# Patient Record
Sex: FEMALE | Race: OTHER | URBAN - METROPOLITAN AREA
[De-identification: names, ages, dates, MRNs, and addresses within clinical notes are randomized per-mention and may not be internally consistent; named-entity substitution may affect disease eponyms.]

---

## 2018-01-01 ENCOUNTER — EMERGENCY (EMERGENCY)
Facility: HOSPITAL | Age: 58
LOS: 0 days | End: 2018-03-20
Attending: EMERGENCY MEDICINE
Payer: COMMERCIAL

## 2018-01-01 VITALS
HEART RATE: 128 BPM | TEMPERATURE: 97 F | SYSTOLIC BLOOD PRESSURE: 101 MMHG | WEIGHT: 229.94 LBS | RESPIRATION RATE: 20 BRPM | HEIGHT: 64 IN | DIASTOLIC BLOOD PRESSURE: 16 MMHG | OXYGEN SATURATION: 56 %

## 2018-01-01 VITALS — HEART RATE: 72 BPM | OXYGEN SATURATION: 67 %

## 2018-01-01 DIAGNOSIS — R06.02 SHORTNESS OF BREATH: ICD-10-CM

## 2018-01-01 DIAGNOSIS — I46.9 CARDIAC ARREST, CAUSE UNSPECIFIED: ICD-10-CM

## 2018-01-01 LAB
ALBUMIN SERPL ELPH-MCNC: 2.7 G/DL — LOW (ref 3.3–5)
ALP SERPL-CCNC: 125 U/L — HIGH (ref 40–120)
ALT FLD-CCNC: 24 U/L — SIGNIFICANT CHANGE UP (ref 12–78)
ANION GAP SERPL CALC-SCNC: 22 MMOL/L — HIGH (ref 5–17)
APTT BLD: 35.5 SEC — SIGNIFICANT CHANGE UP (ref 27.5–37.4)
AST SERPL-CCNC: 32 U/L — SIGNIFICANT CHANGE UP (ref 15–37)
BASOPHILS NFR BLD AUTO: 0 % — SIGNIFICANT CHANGE UP (ref 0–2)
BILIRUB SERPL-MCNC: 0.4 MG/DL — SIGNIFICANT CHANGE UP (ref 0.2–1.2)
BUN SERPL-MCNC: 9 MG/DL — SIGNIFICANT CHANGE UP (ref 7–23)
CALCIUM SERPL-MCNC: 8.3 MG/DL — LOW (ref 8.5–10.1)
CHLORIDE SERPL-SCNC: 103 MMOL/L — SIGNIFICANT CHANGE UP (ref 96–108)
CK MB CFR SERPL CALC: 2 NG/ML — SIGNIFICANT CHANGE UP (ref 0.5–3.6)
CO2 SERPL-SCNC: 14 MMOL/L — LOW (ref 22–31)
CREAT SERPL-MCNC: 1.02 MG/DL — SIGNIFICANT CHANGE UP (ref 0.5–1.3)
EOSINOPHIL NFR BLD AUTO: 0 % — SIGNIFICANT CHANGE UP (ref 0–6)
GLUCOSE SERPL-MCNC: 467 MG/DL — CRITICAL HIGH (ref 70–99)
HCT VFR BLD CALC: 48.7 % — HIGH (ref 34.5–45)
HGB BLD-MCNC: 14.1 G/DL — SIGNIFICANT CHANGE UP (ref 11.5–15.5)
INR BLD: 1 RATIO — SIGNIFICANT CHANGE UP (ref 0.88–1.16)
LACTATE SERPL-SCNC: 14.4 MMOL/L — CRITICAL HIGH (ref 0.7–2)
LYMPHOCYTES # BLD AUTO: 46 % — HIGH (ref 13–44)
LYMPHOCYTES # BLD AUTO: 7.95 K/UL — HIGH (ref 1–3.3)
MCHC RBC-ENTMCNC: 25.4 PG — LOW (ref 27–34)
MCHC RBC-ENTMCNC: 29 GM/DL — LOW (ref 32–36)
MCV RBC AUTO: 87.6 FL — SIGNIFICANT CHANGE UP (ref 80–100)
MONOCYTES NFR BLD AUTO: 7 % — SIGNIFICANT CHANGE UP (ref 2–14)
NEUTROPHILS # BLD AUTO: 7.78 K/UL — HIGH (ref 1.8–7.4)
NEUTROPHILS NFR BLD AUTO: 43 % — SIGNIFICANT CHANGE UP (ref 43–77)
NT-PROBNP SERPL-SCNC: 828 PG/ML — HIGH (ref 0–125)
PLATELET # BLD AUTO: 370 K/UL — SIGNIFICANT CHANGE UP (ref 150–400)
POTASSIUM SERPL-MCNC: 4.3 MMOL/L — SIGNIFICANT CHANGE UP (ref 3.5–5.3)
POTASSIUM SERPL-SCNC: 4.3 MMOL/L — SIGNIFICANT CHANGE UP (ref 3.5–5.3)
PROT SERPL-MCNC: 7.5 GM/DL — SIGNIFICANT CHANGE UP (ref 6–8.3)
PROTHROM AB SERPL-ACNC: 10.9 SEC — SIGNIFICANT CHANGE UP (ref 9.8–12.7)
RBC # BLD: 5.56 M/UL — HIGH (ref 3.8–5.2)
RBC # FLD: 13.2 % — SIGNIFICANT CHANGE UP (ref 10.3–14.5)
SODIUM SERPL-SCNC: 139 MMOL/L — SIGNIFICANT CHANGE UP (ref 135–145)
TROPONIN I SERPL-MCNC: 1.11 NG/ML — HIGH (ref 0.01–0.04)
WBC # BLD: 17.29 K/UL — HIGH (ref 3.8–10.5)
WBC # FLD AUTO: 17.29 K/UL — HIGH (ref 3.8–10.5)

## 2018-01-01 PROCEDURE — 92950 HEART/LUNG RESUSCITATION CPR: CPT

## 2018-01-01 PROCEDURE — 93010 ELECTROCARDIOGRAM REPORT: CPT

## 2018-01-01 PROCEDURE — 99285 EMERGENCY DEPT VISIT HI MDM: CPT | Mod: 25

## 2018-03-20 NOTE — ED ADULT NURSE NOTE - OBJECTIVE STATEMENT
patient was found by ems slumped after bystander called 911 patient reported sob prior to slumping over . acls protocol started in field arrived to er 0935 cpr remained in progress intubated on scene. ivhl left arm by ems functioning well. ivhl right arm #20 blood obtained.  during cpr in er patient regained pulse 4 times. and was coded 4 times. please see cardiac arrest sheet. Teixeira placed with fellow rn no  urine obtained md aware, no further order./ patient with no obvious signs of injury.  lower extremity discoloration with trace edema. patient with copious amounts of pink frothy sputum suctioned by respiratory. after 4 rounds of acls patient was pronounced at 10:32 am. md spoke to family and he is on his wasy in

## 2018-03-20 NOTE — ED PROVIDER NOTE - CRITICAL CARE PROVIDED
direct patient care (not related to procedure)/interpretation of diagnostic studies/additional history taking/documentation

## 2018-03-20 NOTE — ED PROVIDER NOTE - PROGRESS NOTE DETAILS
Pete: spoke to ME Jelly Chung, who spoke to pt's pcp Dr. Mosley, pt with uncontrolled DM, CAD, most likely cause, not an ME case, discussed autopsy with family, does not want autopsy. will fill out death certificate, release body to family through [facility].

## 2018-03-20 NOTE — ED ADULT TRIAGE NOTE - CHIEF COMPLAINT QUOTE
Cardiac arrest notification by STEPHEN ,  Bystander called 911 As per ems patient found on the street leaning against a tree in agonal breathing, placed on the monitor Asystole  , intubated with ETT Size 7.5cm , 22cm to center lip side . 4 EPI given , fluid running . Arrived ED 09:34, compression in progress . Estimated down time 25 minutes as per EMS . F/S 412 MG/DL in the ED

## 2018-03-20 NOTE — ED PROVIDER NOTE - MEDICAL DECISION MAKING DETAILS
cardiac arrest, rosc multiple times briefly, significantly fluid overloaded and remained hypoxic despite increasing peep and max oxygen, only maintained pulse briefly after multiple epi's, then non responsive to epi. given tpa while in arrest in case MI despite no sign on ekg given sudden onset. son and  aware and on way to hospital. pcp Dr. Langford. time of death called 10:32a

## 2018-03-20 NOTE — ED PROVIDER NOTE - OBJECTIVE STATEMENT
56 y/o female hx dm, cad with multiple stents per  present s/p witnessed arrest. C/o sob while in parking lot per EMS, dropped, was pulseless in asystole per EMS entire time, intubated and given 3 epi in field. Spoke to  said pt has multiple stents and bad heart disease. Pt arrived without pulses, one round, rosc, but became noemi multiple times and coded shortly after epi would wear off. Lots of secretions in ett, generalized b lines, pocus shows no right heart strain, no significant effusion, fast neg, unable to visualize aorta.     no ros given cardaic arrest.

## 2018-03-20 NOTE — ED PROVIDER NOTE - PHYSICAL EXAMINATION
Gen: intubated, unresponsive  HEENT: pupils non reactive, blown  CV: pulseless  Resp: intubated, crackles, secretions  GI: obese, no trauma  Neuro: unresponsive.   Skin: No rashes.